# Patient Record
Sex: FEMALE | Race: BLACK OR AFRICAN AMERICAN
[De-identification: names, ages, dates, MRNs, and addresses within clinical notes are randomized per-mention and may not be internally consistent; named-entity substitution may affect disease eponyms.]

---

## 2018-01-23 ENCOUNTER — HOSPITAL ENCOUNTER (OUTPATIENT)
Dept: HOSPITAL 62 - ER | Age: 28
LOS: 2 days | Discharge: HOME | End: 2018-01-25
Attending: SURGERY
Payer: SELF-PAY

## 2018-01-23 DIAGNOSIS — E11.9: ICD-10-CM

## 2018-01-23 DIAGNOSIS — L02.213: Primary | ICD-10-CM

## 2018-01-23 DIAGNOSIS — F17.210: ICD-10-CM

## 2018-01-23 DIAGNOSIS — D50.9: ICD-10-CM

## 2018-01-23 LAB
ADD MANUAL DIFF: NO
ANION GAP SERPL CALC-SCNC: 11 MMOL/L (ref 5–19)
BASOPHILS # BLD AUTO: 0 10^3/UL (ref 0–0.2)
BASOPHILS NFR BLD AUTO: 0.5 % (ref 0–2)
BUN SERPL-MCNC: 13 MG/DL (ref 7–20)
CALCIUM: 10 MG/DL (ref 8.4–10.2)
CHLORIDE SERPL-SCNC: 104 MMOL/L (ref 98–107)
CO2 SERPL-SCNC: 28 MMOL/L (ref 22–30)
EOSINOPHIL # BLD AUTO: 0.2 10^3/UL (ref 0–0.6)
EOSINOPHIL NFR BLD AUTO: 2.1 % (ref 0–6)
ERYTHROCYTE [DISTWIDTH] IN BLOOD BY AUTOMATED COUNT: 14.3 % (ref 11.5–14)
GLUCOSE SERPL-MCNC: 94 MG/DL (ref 75–110)
HCT VFR BLD CALC: 38.4 % (ref 36–47)
HGB BLD-MCNC: 12.7 G/DL (ref 12–15.5)
LYMPHOCYTES # BLD AUTO: 2.2 10^3/UL (ref 0.5–4.7)
LYMPHOCYTES NFR BLD AUTO: 25.3 % (ref 13–45)
MCH RBC QN AUTO: 27.1 PG (ref 27–33.4)
MCHC RBC AUTO-ENTMCNC: 33.1 G/DL (ref 32–36)
MCV RBC AUTO: 82 FL (ref 80–97)
MONOCYTES # BLD AUTO: 0.4 10^3/UL (ref 0.1–1.4)
MONOCYTES NFR BLD AUTO: 4.8 % (ref 3–13)
NEUTROPHILS # BLD AUTO: 5.9 10^3/UL (ref 1.7–8.2)
NEUTS SEG NFR BLD AUTO: 67.3 % (ref 42–78)
PLATELET # BLD: 409 10^3/UL (ref 150–450)
POTASSIUM SERPL-SCNC: 4.1 MMOL/L (ref 3.6–5)
RBC # BLD AUTO: 4.7 10^6/UL (ref 3.72–5.28)
SODIUM SERPL-SCNC: 143.4 MMOL/L (ref 137–145)
TOTAL CELLS COUNTED % (AUTO): 100 %
WBC # BLD AUTO: 8.8 10^3/UL (ref 4–10.5)

## 2018-01-23 PROCEDURE — 87075 CULTR BACTERIA EXCEPT BLOOD: CPT

## 2018-01-23 PROCEDURE — 80048 BASIC METABOLIC PNL TOTAL CA: CPT

## 2018-01-23 PROCEDURE — L8000 MASTECTOMY BRA: HCPCS

## 2018-01-23 PROCEDURE — 82962 GLUCOSE BLOOD TEST: CPT

## 2018-01-23 PROCEDURE — 0H95XZZ DRAINAGE OF CHEST SKIN, EXTERNAL APPROACH: ICD-10-PCS | Performed by: SURGERY

## 2018-01-23 PROCEDURE — 36415 COLL VENOUS BLD VENIPUNCTURE: CPT

## 2018-01-23 PROCEDURE — 87040 BLOOD CULTURE FOR BACTERIA: CPT

## 2018-01-23 PROCEDURE — 85730 THROMBOPLASTIN TIME PARTIAL: CPT

## 2018-01-23 PROCEDURE — 83036 HEMOGLOBIN GLYCOSYLATED A1C: CPT

## 2018-01-23 PROCEDURE — 90686 IIV4 VACC NO PRSV 0.5 ML IM: CPT

## 2018-01-23 PROCEDURE — 87186 SC STD MICRODIL/AGAR DIL: CPT

## 2018-01-23 PROCEDURE — 10061 I&D ABSCESS COMP/MULTIPLE: CPT

## 2018-01-23 PROCEDURE — 85025 COMPLETE CBC W/AUTO DIFF WBC: CPT

## 2018-01-23 PROCEDURE — 87205 SMEAR GRAM STAIN: CPT

## 2018-01-23 PROCEDURE — 83735 ASSAY OF MAGNESIUM: CPT

## 2018-01-23 PROCEDURE — 87077 CULTURE AEROBIC IDENTIFY: CPT

## 2018-01-23 PROCEDURE — 87070 CULTURE OTHR SPECIMN AEROBIC: CPT

## 2018-01-23 PROCEDURE — 96365 THER/PROPH/DIAG IV INF INIT: CPT

## 2018-01-23 PROCEDURE — 99284 EMERGENCY DEPT VISIT MOD MDM: CPT

## 2018-01-23 PROCEDURE — 84100 ASSAY OF PHOSPHORUS: CPT

## 2018-01-23 RX ADMIN — PIPERACILLIN AND TAZOBACTAM SCH GM: 3; .375 INJECTION, POWDER, LYOPHILIZED, FOR SOLUTION INTRAVENOUS; PARENTERAL at 22:45

## 2018-01-23 RX ADMIN — MORPHINE SULFATE PRN MG: 10 INJECTION INTRAMUSCULAR; INTRAVENOUS; SUBCUTANEOUS at 22:37

## 2018-01-23 NOTE — ER DOCUMENT REPORT
ED General





- General


Chief Complaint: Abscess


Stated Complaint: ABSCESS


Time Seen by Provider: 01/23/18 15:39


Mode of Arrival: Ambulatory


Information source: Patient


Notes: 





Patient is a 27 year old female with PMHx of HTN, DM (not currently on any 

medication), Hx of MRSA who presents with abscess to beneath both breats that 

started 3 days ago with associated yellow drainage. She describes the pain as 

burning. Denies fever, chills, headache, lethargy, n/v/d. She has not taken any 

medication for this. She has had problems with mutiple abscess requiring 

drainage in OR, last was in 2015 which was + MRSA and required PICC line for 

continued antibiotics. Last meal was yesterday, she has had nothing to eat or 

drink today.


TRAVEL OUTSIDE OF THE [place] IN LAST 30 DAYS: No





- Related Data


Allergies/Adverse Reactions: 


 





No Known Allergies Allergy (Verified 08/24/15 09:40)


 











Past Medical History





- Social History


Smoking Status: Current Every Day Smoker


Family History: Reviewed & Not Pertinent, Other - Father: Is positive for 

diabetes type II.  Maternal grandmother: Has breast cancer.  The patient's 

mother is alive without any health problems.





- Past Medical History


Cardiac Medical History: 


   Denies: Hx Coronary Artery Disease, Hx Heart Attack, Hx Hypertension


Pulmonary Medical History: 


   Denies: Hx Asthma, Hx Bronchitis, Hx COPD, Hx Pneumonia, Hx Tuberculosis


Neurological Medical History: Denies: Hx Cerebrovascular Accident, Hx Seizures


Endocrine Medical History: Reports: Hx Diabetes Mellitus Type 2


Musculoskeltal Medical History: Denies Hx Arthritis


Skin Medical History: Reports Hx Cellulitis - abscesses


Psychiatric Medical History: Reports: Hx Depression


Past Surgical History: Reports: Hx Breast Surgery - cyst/abscess removal.  

Denies: Hx Hysterectomy, Hx Pacemaker





- Immunizations


Hx Diphtheria, Pertussis, Tetanus Vaccination: No





Review of Systems





- Review of Systems


Constitutional: See HPI


EENT: No symptoms reported


Cardiovascular: No symptoms reported


Respiratory: No symptoms reported


Gastrointestinal: No symptoms reported


Genitourinary: No symptoms reported


Female Genitourinary: No symptoms reported


Musculoskeletal: No symptoms reported


Skin: See HPI


Hematologic/Lymphatic: No symptoms reported


Neurological/Psychological: No symptoms reported





Physical Exam





- Vital signs


Vitals: 


 











Temp Pulse Resp BP Pulse Ox


 


 98.5 F   91   20   119/71   93 


 


 01/23/18 14:22  01/23/18 14:22  01/23/18 14:22  01/23/18 14:22  01/23/18 14:22














- Notes


Notes: 





PHYSICAL EXAM:


CONSTITUTIONAL: Alert and oriented, well-appearing and in no acute distress. 


HENT: Normocephalic, atraumatic.  Nares clear without erythema, septal hematoma 

or deviation, airway patent. Oropharynx clear without erythema, tonsilar 

exudate or malocclusion. Trachea midline. Uvula midline. Moist mucous membranes.


EYES: Pupils equal round and reactive to light, EOM intact. Sclera anicteric, 

conjunctiva are normal. No entrapment. 


NECK: supple without lymphadenopathy. No midline tenderness or paraspinous 

muscle spasms. No step-offs or deformities. ROM intact. 


HEART: Regular rate and rhythm without murmurs. 


LUNGS: CTAB and equal. No wheezes, rales or rhonchi. 


BACK: nontender, no paraspinous spasm, 5+/5 strengths, DTRs 2+, SLR -. 


EXTREMITIES: Normal range of motion, no pitting edema. No cyanosis. Cap Refill <

3 seconds.


NEURO: Cranial nerves grossly intact. Normal sensory/motor exams.


PSYCH: Normal mood, normal affect. 


SKIN: Warm and dry. Normal turgor. Extensive scarring and thickening tracts 

consistent with chronic subcutaneous infection with 3-4 areas of tenderness and 

purulent drainage from tracts/previous scarring





Course





- Re-evaluation


Re-evalutation: 





01/23/18 15:57


Patient seen and examined. She is afebrile, non-tachycardic with normal BP, well

-hydrated and non-toxic. Extensive scarring and areas of cellulitis vs abscess 

to anterior chest wall beneath breasts bilaterally. Yellow drainage noted from 

several areas but dard to determine at bedside where collection of fluid is vs 

keloidation of previous surgical scars.





I have consulted with the supervisory physician per [facility] guidelines, 

Dr. Lopez who also evaluated patient at bedside and agreed with surgical 

consultation and management for extensive chest wall/breast abscess. Will start 

IV, make NPO, obtain labs, CT of chest wall and initiate IV abx. Will obtain 

surgical consult. 





01/23/18 16:29


Consulted with Dr. Weir, surgeon on call who will evaluate patient at 

bedside.





01/23/18 17:00


Evaluated patient with Dr. Weir at bedside who will admit for surgery. 








- Vital Signs


Vital signs: 


 











Temp Pulse Resp BP Pulse Ox


 


 98.5 F   91   20   119/71   93 


 


 01/23/18 14:22  01/23/18 14:22  01/23/18 14:22  01/23/18 14:22  01/23/18 14:22














- Laboratory


Result Diagrams: 


 01/23/18 16:40





 01/23/18 16:40


Laboratory results interpreted by me: 


 











  01/23/18





  16:40


 


RDW  14.3 H














Discharge





- Discharge


Clinical Impression: 


 Breast abscess, Chest wall abscess





Condition: Stable


Disposition: SAME DAY SURGERY


Admitting Provider: Surgicalist - Dr. Weir


Unit Admitted: Surgical Floor

## 2018-01-23 NOTE — OPERATIVE REPORT
Operative Report


DATE OF SURGERY: 01/23/18


PREOPERATIVE DIAGNOSIS: multiple subcutaneous abscesses lower anterior chest 

wall (4: two lower midline above xyfoid process, one right and one left 

inframammary line)


POSTOPERATIVE DIAGNOSIS: same


OPERATION: I&D four lower anterior chest wall subcutanous abscesses


SURGEON: JD DELVALLE


ANESTHESIA: Other - plus 40 mL 0.5% marcaine with epi


TISSUE REMOVED OR ALTERED: cx taken for aerobic, anaerobic, gram stain


COMPLICATIONS: 





none


ESTIMATED BLOOD LOSS: <5 ml


INTRAOPERATIVE FINDINGS: Four subcutaneous abscesses anterior, lower chest wall


PROCEDURE: 





see dictation

## 2018-01-23 NOTE — OPERATIVE REPORT E
Operative Report



NAME: DYLON DUTTON

MRN:  [id number]          : 1990 AGE:  27Y

DATE OF SURGERY: 2018              ROOM:



PREOPERATIVE DIAGNOSIS:

Multiple anterior lower chest wall subcutaneous abscesses (2 located along the 
midline

over the xiphoid process, 1 along the right inframammary line, 1 along the

left inframammary line).



POSTOPERATIVE DIAGNOSIS:

Multiple anterior lower chest wall subcutaneous abscesses (2 located along the 
midline

over the xiphoid process, 1 along the right inframammary line, 1 along the

left inframammary line).



OPERATION:

Incision and drainage of 4 anterior lower chest wall subcutaneous

abscesses.



SURGEON:

JD DELVALLE M.D.



ASSISTANT:

None.



ESTIMATED BLOOD LOSS:

Minimal.  Less than 10 mL.



COMPLICATIONS:

None.



ANESTHESIA:

General plus 40 mL of 0.25% Marcaine with epinephrine.



INDICATION FOR PROCEDURE AND FINDINGS:

This is a 27-year-old -American female with a history of type 1

diabetes, noncompliance, previous history of multiple mid lower chest wall

subcutaneous abscesses infected with MRSA drained in .  She presents

to the Emergency Room with pain, swelling, drainage, redness of the

lower mid anterior chest wall with 4 areas of tenderness, two of those draining 

foul purulent material.  The decision was made today that patient

go to surgery undergo incision and drainage of the abscess sites.



DESCRIPTION OF PROCEDURE:

It was done in the operating room.  The patient was placed in supine

position.  General anesthesia induced by endotracheal intubation.  The

chest was prepped and draped in the usual fashion and the markings of the

4 subcutaneous abscesses previously placed were maintained.  Each

subcutaneous abscess area was infiltrated with 0.5% Marcaine with

epinephrine and a single small incision about 1/4 inch in diameter were

made on each abscess.  Only 2 abscesses were found to have a very small

amount of purulent material.  The remaining abscesses all drained

spontaneously.  The subcutaneous tissue was opened with a hemostat and 2

of the abscesses located along the midline and the 1 on the right

inframammary line were connected together with a single 1/4 inch Penrose

drain which was tied to itself using a 2-0 silk suture.  The fourth

abscess located in the left inframammary line was then opened with a small

1/4 inch incision.  A counter incision was made.  The subcutaneous issues

was explored.  No purulent material was obtained and a Penrose drain

was inserted through the 2 incisions and tied to itself with a 2-0 silk

suture.  Each incision was then irrigated with about 60 mL of warm normal

saline.  Each incision was coated with

Bacitracin ointment.  Sterile dressings were applied together with a

surgical bra.  The patient tolerated the procedure well, extubated,

transferred to a regular room in satisfactory conditions.





DICTATING PHYSICIAN:  JD DELVALLE M.D.





[id number]                  DT: 2018

PHY#: [id number]            DD: 2018

ID:   [id number]           JOB#: [id number]       ACCT: [id number]



cc:JD DELVALLE M.D.

>







MTDD

## 2018-01-24 RX ADMIN — PIPERACILLIN AND TAZOBACTAM SCH GM: 3; .375 INJECTION, POWDER, LYOPHILIZED, FOR SOLUTION INTRAVENOUS; PARENTERAL at 22:49

## 2018-01-24 RX ADMIN — VANCOMYCIN HYDROCHLORIDE SCH MG: 1 INJECTION, POWDER, LYOPHILIZED, FOR SOLUTION INTRAVENOUS at 18:07

## 2018-01-24 RX ADMIN — BACITRACIN SCH APPLIC: 500 OINTMENT TOPICAL at 18:08

## 2018-01-24 RX ADMIN — PIPERACILLIN AND TAZOBACTAM SCH GM: 3; .375 INJECTION, POWDER, LYOPHILIZED, FOR SOLUTION INTRAVENOUS; PARENTERAL at 06:56

## 2018-01-24 RX ADMIN — MORPHINE SULFATE PRN MG: 10 INJECTION INTRAMUSCULAR; INTRAVENOUS; SUBCUTANEOUS at 03:23

## 2018-01-24 RX ADMIN — PIPERACILLIN AND TAZOBACTAM SCH GM: 3; .375 INJECTION, POWDER, LYOPHILIZED, FOR SOLUTION INTRAVENOUS; PARENTERAL at 15:19

## 2018-01-24 RX ADMIN — ENOXAPARIN SODIUM SCH MG: 40 INJECTION SUBCUTANEOUS at 11:35

## 2018-01-24 RX ADMIN — TRAMADOL HYDROCHLORIDE PRN MG: 50 TABLET, FILM COATED ORAL at 22:49

## 2018-01-24 RX ADMIN — BACITRACIN SCH APPLIC: 500 OINTMENT TOPICAL at 11:38

## 2018-01-24 RX ADMIN — FERROUS SULFATE TAB 325 MG (65 MG ELEMENTAL FE) SCH MG: 325 (65 FE) TAB at 18:07

## 2018-01-24 RX ADMIN — MORPHINE SULFATE PRN MG: 10 INJECTION INTRAMUSCULAR; INTRAVENOUS; SUBCUTANEOUS at 12:09

## 2018-01-24 NOTE — PDOC PROGRESS REPORT
Subjective


Progress Note for:: 01/24/18


Subjective:: 





mild chest wall pain


Reason For Visit: 


ABSCESS OF BREAST, ABSCESS OF CHEST WALL








Physical Exam


Vital Signs: 


 











Temp Pulse Resp BP Pulse Ox


 


 98.5 F   78   17   110/60   100 


 


 01/24/18 11:27  01/24/18 11:27  01/24/18 11:27  01/24/18 11:27  01/24/18 11:27








 Intake & Output











 01/23/18 01/24/18 01/25/18





 06:59 06:59 06:59


 


Intake Total  1230 


 


Output Total  605 


 


Balance  625 


 


Weight  74.9 kg 











Respiratory exam: PRESENT: chest wall tenderness - at drained abscess sites, 

clean, minimal serosanguinous drainage without odor





Results


Laboratory Results: 


 





 01/23/18 16:40 





 01/23/18 16:40 





 











  01/23/18 01/23/18





  16:40 16:40


 


WBC  8.8 


 


RBC  4.70 


 


Hgb  12.7 


 


Hct  38.4 


 


MCV  82 


 


MCH  27.1 


 


MCHC  33.1 


 


RDW  14.3 H 


 


Plt Count  409 


 


Seg Neutrophils %  67.3 


 


Lymphocytes %  25.3 


 


Monocytes %  4.8 


 


Eosinophils %  2.1 


 


Basophils %  0.5 


 


Absolute Neutrophils  5.9 


 


Absolute Lymphocytes  2.2 


 


Absolute Monocytes  0.4 


 


Absolute Eosinophils  0.2 


 


Absolute Basophils  0.0 


 


Sodium   143.4


 


Potassium   4.1


 


Chloride   104


 


Carbon Dioxide   28


 


Anion Gap   11


 


BUN   13


 


Creatinine   0.84


 


Est GFR ( Amer)   > 60


 


Est GFR (Non-Af Amer)   > 60


 


Glucose   94


 


Calcium   10.0














Assessment & Plan





- Diagnosis


(1) Hidrosadenitis


Is this a current diagnosis for this admission?: Yes   





- Plan Summary


Plan Summary: 





POD #1 post I&D multiple anterior chest wall abscesses (four)


VSS, AF


PE shows clan drained sites


Cx pending:  gram stain shows GPC and GNR











P/





Change to PO pain meds


Heplock IVF


continue Zosyn/Vanco


consult hospitalist for her diabetes treatment and compliance

## 2018-01-24 NOTE — PDOC CONSULTATION
Consultation


Consult Date: 18


Attending physician:: JD DELVALLE


Consult reason:: Concern for DM Type 2





History of Present Illness


Admission Date/PCP: 


  





  SURGICAL SURGICALIST





History of Present Illness: 





Pt is a 27-year-old female that presented to the hospital under surgeries care 

who was underwent I&D of left breast abscess.  Surgery requested consult due to 

concern for diabetes.  Patient reports that when she was 12 she weighed 210 

pounds was diagnosed with type 2 diabetes.  Patient's mother at that time 

states that patient was placed on insulin however as she grew older and lost 

weight she did not require insulin.  Mother reports that she would go to her 

doctor's office and he would tell her that she no longer required insulin.  

Surgery requested consult due to patient's history of diabetes type 2 and 

patient not being on insulin.  Denies any nausea vomiting diarrhea or 

constipation.  Patient does admit to increased thirst and urination.  Patient 

reports that she has not checked her blood glucose in over 10 years.





Past Medical History


Cardiac Medical History: 


   Denies: Coronary Artery Disease, Myocardial Infarction, Hypertension


Pulmonary Medical History: 


   Denies: Asthma, Bronchitis, Chronic Obstructive Pulmonary Disease (COPD), 

Pneumonia, Tuberculosis


Neurological Medical History: 


   Denies: Seizures


Endocrine Medical History: Reports: Diabetes Mellitus Type 2


Musculoskeltal Medical History: 


   Denies: Arthritis


Psychiatric Medical History: Reports: Depression


Hematology: Reports: Anemia - Possible iron deficiency anemia





Past Surgical History


Past Surgical History: Reports: Other - incision drainage of chest wall MRSA 

abscesses in 


   Denies: Hysterectomy, Pacemaker





Social History


Smoking Status: Current Every Day Smoker


Cigarettes Packs Per Day: 0.5


Number of Years Smokin


Last Time Smoked: 2018


Frequency of Alcohol Use: None


Hx Recreational Drug Use: No


Drugs: None


Hx Prescription Drug Abuse: No





- Advance Directive


Resuscitation Status: Full Code





Family History


Family History: Reviewed & Not Pertinent, Other - Father: Is positive for 

diabetes type II.  Maternal grandmother: Has breast cancer.  The patient's 

mother is alive without any health problems.


Parental Family History Reviewed: Yes


Children Family History Reviewed: Yes


Sibling(s) Family History Reviewed.: Yes





Medication/Allergy


Allergies/Adverse Reactions: 


 





No Known Allergies Allergy (Verified 08/24/15 09:40)


 











Review of Systems


Constitutional: ABSENT: chills, fever(s), headache(s), weight gain, weight loss


Eyes: ABSENT: visual disturbances


Ears: ABSENT: hearing changes


Cardiovascular: ABSENT: chest pain, dyspnea on exertion, edema, orthropnea, 

palpitations


Respiratory: ABSENT: cough, hemoptysis


Gastrointestinal: ABSENT: abdominal pain, constipation, diarrhea, hematemesis, 

hematochezia, nausea, vomiting


Genitourinary: ABSENT: dysuria, hematuria


Musculoskeletal: ABSENT: joint swelling


Integumentary: ABSENT: rash, wounds


Psychiatric: ABSENT: anxiety, depression, homidical ideation, suicidal ideation


Endocrine: PRESENT: polydipsia, polyphagia, polyuria


Hematologic/Lymphatic: ABSENT: easy bleeding, easy bruising





Physical Exam


Vital Signs: 


 











Temp Pulse Resp BP Pulse Ox


 


 98.5 F   78   17   110/60   100 


 


 18 11:27  18 11:27  18 11:27  18 11:27  18 11:27








 Intake & Output











 18





 06:59 06:59 06:59


 


Intake Total  1230 1769


 


Output Total  605 


 


Balance  625 1769


 


Weight  74.9 kg 











General appearance: PRESENT: no acute distress, well-developed, well-nourished


Head exam: PRESENT: atraumatic, normocephalic


Eye exam: PRESENT: conjunctiva pink, EOMI.  ABSENT: scleral icterus


Ear exam: PRESENT: normal external ear exam


Mouth exam: PRESENT: moist, tongue midline


Neck exam: ABSENT: carotid bruit, JVD, lymphadenopathy, thyromegaly


Respiratory exam: PRESENT: clear to auscultation ysabel.  ABSENT: rales, rhonchi, 

wheezes


Cardiovascular exam: PRESENT: RRR.  ABSENT: diastolic murmur, rubs, systolic 

murmur


Pulses: PRESENT: normal dorsalis pedis pul


Vascular exam: PRESENT: normal capillary refill


GI/Abdominal exam: PRESENT: normal bowel sounds, soft.  ABSENT: distended, 

guarding, mass, organolmegaly, rebound, tenderness


Rectal exam: PRESENT: deferred


Extremities exam: PRESENT: full ROM.  ABSENT: calf tenderness, clubbing, pedal 

edema


Musculoskeletal exam: PRESENT: full ROM


Neurological exam: PRESENT: alert, awake, oriented to person, oriented to place

, oriented to time, oriented to situation, CN II-XII grossly intact.  ABSENT: 

motor sensory deficit


Psychiatric exam: PRESENT: appropriate affect, normal mood.  ABSENT: homicidal 

ideation, suicidal ideation


Skin exam: PRESENT: dry, intact, warm.  ABSENT: cyanosis, rash





Results


Laboratory Results: 


 





 18 16:40 





 18 16:40 





 











  18





  16:40


 


Sodium  143.4


 


Potassium  4.1


 


Chloride  104


 


Carbon Dioxide  28


 


Anion Gap  11


 


BUN  13


 


Creatinine  0.84


 


Est GFR ( Amer)  > 60


 


Est GFR (Non-Af Amer)  > 60


 


Glucose  94


 


Calcium  10.0














Assessment & Plan





- Diagnosis


(1) Anterior wall abscess


Is this a current diagnosis for this admission?: Yes   


Plan: 


S/P I and D multiple anterior chest wall abscesses:  Pt currently on 

antibiotics.  Per Surgery's recommendation. 








(2) History of diabetes type 2


Is this a current diagnosis for this admission?: Yes   


Plan: 


Will check HBGA1C.  Will place on SSI. 








(3) Iron deficiency anemia


Is this a current diagnosis for this admission?: Yes   


Plan: 


Will place on Iron replacement. 








(4) Obesity (BMI 30.0-34.9)


Is this a current diagnosis for this admission?: Yes   


Plan: 


Will encourage dietary changes. 








(5) DVT prophylaxis


Is this a current diagnosis for this admission?: Yes   


Plan: 


Lovenox.








- Time


Time Spent: 30 to 50 Minutes

## 2018-01-25 VITALS — SYSTOLIC BLOOD PRESSURE: 110 MMHG | DIASTOLIC BLOOD PRESSURE: 63 MMHG

## 2018-01-25 LAB
ADD MANUAL DIFF: NO
ANION GAP SERPL CALC-SCNC: 11 MMOL/L (ref 5–19)
BASOPHILS # BLD AUTO: 0.1 10^3/UL (ref 0–0.2)
BASOPHILS NFR BLD AUTO: 0.5 % (ref 0–2)
BUN SERPL-MCNC: 14 MG/DL (ref 7–20)
CALCIUM: 9.1 MG/DL (ref 8.4–10.2)
CHLORIDE SERPL-SCNC: 106 MMOL/L (ref 98–107)
CO2 SERPL-SCNC: 25 MMOL/L (ref 22–30)
EOSINOPHIL # BLD AUTO: 0.2 10^3/UL (ref 0–0.6)
EOSINOPHIL NFR BLD AUTO: 1.4 % (ref 0–6)
ERYTHROCYTE [DISTWIDTH] IN BLOOD BY AUTOMATED COUNT: 14.2 % (ref 11.5–14)
GLUCOSE SERPL-MCNC: 97 MG/DL (ref 75–110)
HCT VFR BLD CALC: 31.8 % (ref 36–47)
HGB BLD-MCNC: 10.3 G/DL (ref 12–15.5)
LYMPHOCYTES # BLD AUTO: 3.6 10^3/UL (ref 0.5–4.7)
LYMPHOCYTES NFR BLD AUTO: 32 % (ref 13–45)
MAGNESIUM SERPL-MCNC: 2.2 MG/DL (ref 1.6–2.3)
MCH RBC QN AUTO: 26.4 PG (ref 27–33.4)
MCHC RBC AUTO-ENTMCNC: 32.4 G/DL (ref 32–36)
MCV RBC AUTO: 82 FL (ref 80–97)
MONOCYTES # BLD AUTO: 0.9 10^3/UL (ref 0.1–1.4)
MONOCYTES NFR BLD AUTO: 7.8 % (ref 3–13)
NEUTROPHILS # BLD AUTO: 6.6 10^3/UL (ref 1.7–8.2)
NEUTS SEG NFR BLD AUTO: 58.3 % (ref 42–78)
PHOSPHATE SERPL-MCNC: 4.3 MG/DL (ref 2.5–4.5)
PLATELET # BLD: 345 10^3/UL (ref 150–450)
POTASSIUM SERPL-SCNC: 4.5 MMOL/L (ref 3.6–5)
RBC # BLD AUTO: 3.9 10^6/UL (ref 3.72–5.28)
SODIUM SERPL-SCNC: 141.6 MMOL/L (ref 137–145)
TOTAL CELLS COUNTED % (AUTO): 100 %
WBC # BLD AUTO: 11.3 10^3/UL (ref 4–10.5)

## 2018-01-25 RX ADMIN — BACITRACIN SCH APPLIC: 500 OINTMENT TOPICAL at 10:58

## 2018-01-25 RX ADMIN — PIPERACILLIN AND TAZOBACTAM SCH GM: 3; .375 INJECTION, POWDER, LYOPHILIZED, FOR SOLUTION INTRAVENOUS; PARENTERAL at 05:24

## 2018-01-25 RX ADMIN — PIPERACILLIN AND TAZOBACTAM SCH GM: 3; .375 INJECTION, POWDER, LYOPHILIZED, FOR SOLUTION INTRAVENOUS; PARENTERAL at 14:50

## 2018-01-25 RX ADMIN — TRAMADOL HYDROCHLORIDE PRN MG: 50 TABLET, FILM COATED ORAL at 10:57

## 2018-01-25 RX ADMIN — ENOXAPARIN SODIUM SCH MG: 40 INJECTION SUBCUTANEOUS at 10:57

## 2018-01-25 RX ADMIN — VANCOMYCIN HYDROCHLORIDE SCH MG: 1 INJECTION, POWDER, LYOPHILIZED, FOR SOLUTION INTRAVENOUS at 05:24

## 2018-01-25 RX ADMIN — FERROUS SULFATE TAB 325 MG (65 MG ELEMENTAL FE) SCH MG: 325 (65 FE) TAB at 10:57

## 2018-01-25 NOTE — PDOC PROGRESS REPORT
Subjective


Progress Note for:: 01/25/18


Subjective:: 





Pt states that she is having some pain in her chest. Pt states that her drains 

are still in place.


Reason For Visit: 


ABSCESS OF BREAST, ABSCESS OF CHEST WALL








Physical Exam


Vital Signs: 


 











Temp Pulse Resp BP Pulse Ox


 


 98.4 F   65   12   104/66   100 


 


 01/25/18 07:10  01/25/18 07:10  01/25/18 07:10  01/25/18 07:10  01/25/18 07:10








 Intake & Output











 01/24/18 01/25/18 01/26/18





 06:59 06:59 06:59


 


Intake Total 1230 2909 280


 


Output Total 605 700 


 


Balance 625 2209 280


 


Weight 74.9 kg 80.4 kg 











General appearance: PRESENT: no acute distress, well-developed, well-nourished


Head exam: PRESENT: atraumatic, normocephalic


Eye exam: PRESENT: conjunctiva pink, EOMI.  ABSENT: scleral icterus


Ear exam: PRESENT: normal external ear exam


Mouth exam: PRESENT: moist, tongue midline


Neck exam: ABSENT: carotid bruit, JVD, lymphadenopathy, thyromegaly


Respiratory exam: PRESENT: clear to auscultation ysabel.  ABSENT: rales, rhonchi, 

wheezes


Cardiovascular exam: PRESENT: RRR.  ABSENT: diastolic murmur, rubs, systolic 

murmur


Pulses: PRESENT: normal dorsalis pedis pul


Vascular exam: PRESENT: normal capillary refill


GI/Abdominal exam: PRESENT: normal bowel sounds, soft.  ABSENT: distended, 

guarding, mass, organolmegaly, rebound, tenderness


Rectal exam: PRESENT: deferred


Extremities exam: PRESENT: full ROM.  ABSENT: calf tenderness, clubbing, pedal 

edema


Neurological exam: PRESENT: alert, awake, oriented to person, oriented to place

, oriented to time, oriented to situation, CN II-XII grossly intact.  ABSENT: 

motor sensory deficit


Psychiatric exam: PRESENT: appropriate affect, normal mood.  ABSENT: homicidal 

ideation, suicidal ideation


Skin exam: PRESENT: dry, intact, warm.  ABSENT: cyanosis, rash





Results


Laboratory Results: 


 





 01/25/18 04:53 





 01/25/18 04:53 





 











  01/25/18 01/25/18





  04:53 04:53


 


WBC  11.3 H 


 


RBC  3.90 


 


Hgb  10.3 L D 


 


Hct  31.8 L 


 


MCV  82 


 


MCH  26.4 L 


 


MCHC  32.4 


 


RDW  14.2 H 


 


Plt Count  345 


 


Seg Neutrophils %  58.3 


 


Lymphocytes %  32.0 


 


Monocytes %  7.8 


 


Eosinophils %  1.4 


 


Basophils %  0.5 


 


Absolute Neutrophils  6.6 


 


Absolute Lymphocytes  3.6 


 


Absolute Monocytes  0.9 


 


Absolute Eosinophils  0.2 


 


Absolute Basophils  0.1 


 


Sodium   141.6


 


Potassium   4.5


 


Chloride   106


 


Carbon Dioxide   25


 


Anion Gap   11


 


BUN   14


 


Creatinine   0.92


 


Est GFR ( Amer)   > 60


 


Est GFR (Non-Af Amer)   > 60


 


Glucose   97


 


Calcium   9.1


 


Phosphorus   4.3


 


Magnesium   2.2








 





01/23/18 18:10   Chest - Chest Wall   Gram Stain - Final











Assessment & Plan





- Diagnosis


(1) Anterior wall abscess


Is this a current diagnosis for this admission?: Yes   


Plan: 


S/P I and D multiple anterior chest wall abscesses comp located by 

Corynebacterium and gram-positive cocci:  Pt currently on vancomycin and Zosyn.

  Per Surgery's recommendation. 








(2) History of diabetes type 2


Is this a current diagnosis for this admission?: Yes   


Plan: 


HBGA1C pending.  SSI. 








(3) Iron deficiency anemia


Is this a current diagnosis for this admission?: Yes   


Plan: 


Will continue Iron replacement. 








(4) Obesity (BMI 30.0-34.9)


Is this a current diagnosis for this admission?: Yes   


Plan: 


Will encourage dietary changes. 








(5) DVT prophylaxis


Is this a current diagnosis for this admission?: Yes   


Plan: 


Lovenox.

## 2018-01-25 NOTE — PDOC DISCHARGE SUMMARY
General





- Admit/Disc Date/PCP


Admission Date/Primary Care Provider: 


  





  SURGICAL SURGICALIST





Discharge Date: 01/25/18





- Discharge Diagnosis


(1) abscess of inframammary


Is this a current diagnosis for this admission?: Yes   


Summary: 


Patient was admitted with abscesses of chest wall at inframammary regions of 

both breast.Taken to [facility] 1/23/2018 for I & D of multiple abscesses. Drains (

penrose) left in. Patient is doing well and is discharged home with 

instructions for wound care, and antibx/analgesics. Wll follow-up with [facility] in  7-10 days.








(2) Anterior wall abscess


Is this a current diagnosis for this admission?: Yes   





- Additional Information


Resuscitation Status: Full Code


Discharge Diet: Regular


Discharge Activity: Activity As Tolerated, Balance Activity w/Rest


Prescriptions: 


Tramadol HCl [Ultram 50 mg Tablet] 50 mg PO Q6HP PRN #20 tablet


 PRN Reason: 


Amox Tr/Potassium Clavulanate [Augmentin 875-125 mg Tablet] 1 tab PO BID #20 

tablet


Home Medications: 








Acetaminophen [Tylenol 325 mg Tablet] 325 mg PO Q4HP PRN  tablet 01/25/18 


Amox Tr/Potassium Clavulanate [Augmentin 875-125 mg Tablet] 1 tab PO BID #20 

tablet 01/25/18 


Bacitracin Zinc [Bacitracin Oint 15 gm] 1 applic TP BID  tube 01/25/18 


Ferrous Sulfate [Feosol 325 mg Tablet] 325 mg PO BIDPCBS  tablet 01/25/18 


Tramadol HCl [Ultram 50 mg Tablet] 50 mg PO Q6HP PRN #20 tablet 01/25/18 











History of Present Illness


History of Present Illness: 


Patient was admitted with abscesses of chest wall at inframammary regions of 

both breasts.Taken to [facility] 1/23/2018 for I & D of multiple abscesses. Drains (

penrose) left in. Patient is doing well and is discharged home with 

instructions for wound care, and antibx/analgesics. Wll follow-up with [facility] in  7-10 days.








Hospital Course


Hospital Course: 


Patient was admitted with abscesses of chest wall at inframammary regions of 

both breast.Taken to [facility] 1/23/2018 for I & D of multiple abscesses. Drains (

penrose) left in. Patient is doing well and is discharged home with 

instructions for wound care, and antibx/analgesics. Wll follow-up with [facility] in  7-10 days.








Physical Exam


Vital Signs: 


 











Temp Pulse Resp BP Pulse Ox


 


 98.5 F   74   12   119/68   100 


 


 01/25/18 11:14  01/25/18 11:14  01/25/18 11:14  01/25/18 11:14  01/25/18 11:14








 Intake & Output











 01/24/18 01/25/18 01/26/18





 06:59 06:59 06:59


 


Intake Total 1230 2909 930


 


Output Total 605 700 


 


Balance 625 2209 930


 


Weight 74.9 kg 80.4 kg 











General appearance: PRESENT: no acute distress


Head exam: PRESENT: atraumatic, normocephalic


Eye exam: PRESENT: conjunctiva pink


Mouth exam: PRESENT: moist, neck supple


Neck exam: PRESENT: full ROM.  ABSENT: JVD, lymphadenopathy, tenderness, 

thyromegaly, tracheal deviation


Respiratory exam: PRESENT: clear to auscultation ysabel, unlabored


Cardiovascular exam: PRESENT: RRR


GI/Abdominal exam: PRESENT: normal bowel sounds, soft.  ABSENT: distended, 

guarding, hernia, mass, tenderness


Additonal comments: 


Drains in place through the connecting abscesses of the inframammary regions of 

both breasts 


Skin exam: PRESENT: other - Drains (penrose) in place through the connecting 

abscesses of the inframammary regions of both breasts





Results


Laboratory Results: 


 





 01/25/18 04:53 





 01/25/18 04:53 





 











  01/25/18 01/25/18





  04:53 04:53


 


WBC  11.3 H 


 


RBC  3.90 


 


Hgb  10.3 L D 


 


Hct  31.8 L 


 


MCV  82 


 


MCH  26.4 L 


 


MCHC  32.4 


 


RDW  14.2 H 


 


Plt Count  345 


 


Seg Neutrophils %  58.3 


 


Lymphocytes %  32.0 


 


Monocytes %  7.8 


 


Eosinophils %  1.4 


 


Basophils %  0.5 


 


Absolute Neutrophils  6.6 


 


Absolute Lymphocytes  3.6 


 


Absolute Monocytes  0.9 


 


Absolute Eosinophils  0.2 


 


Absolute Basophils  0.1 


 


Sodium   141.6


 


Potassium   4.5


 


Chloride   106


 


Carbon Dioxide   25


 


Anion Gap   11


 


BUN   14


 


Creatinine   0.92


 


Est GFR ( Amer)   > 60


 


Est GFR (Non-Af Amer)   > 60


 


Glucose   97


 


Calcium   9.1


 


Phosphorus   4.3


 


Magnesium   2.2








 





01/23/18 18:10   Chest - Chest Wall   Gram Stain - Final


01/23/18 18:10   Chest - Chest Wall   Wound Culture - Final


                            Corynebacterium Species


                            Peptostreptococcus Species


                            Prevotella Species


01/23/18 18:10   Chest - Not Specified   Gram Stain - Final


01/23/18 18:10   Chest - Not Specified   Wound Culture - Final


                            Corynebacterium Species


                            Peptostreptococcus Species


                            Prevotella Species


01/23/18 16:40   Breast - Abscess   Gram Stain - Final











Plan


Time Spent: Less than 30 Minutes